# Patient Record
Sex: MALE | Race: WHITE | URBAN - METROPOLITAN AREA
[De-identification: names, ages, dates, MRNs, and addresses within clinical notes are randomized per-mention and may not be internally consistent; named-entity substitution may affect disease eponyms.]

---

## 2019-02-20 ENCOUNTER — OFFICE VISIT (OUTPATIENT)
Dept: FAMILY MEDICINE CLINIC | Age: 44
End: 2019-02-20

## 2019-02-20 VITALS
HEIGHT: 69 IN | HEART RATE: 66 BPM | WEIGHT: 156 LBS | DIASTOLIC BLOOD PRESSURE: 74 MMHG | TEMPERATURE: 98.2 F | BODY MASS INDEX: 23.11 KG/M2 | OXYGEN SATURATION: 99 % | SYSTOLIC BLOOD PRESSURE: 110 MMHG | RESPIRATION RATE: 12 BRPM

## 2019-02-20 DIAGNOSIS — F41.9 ANXIETY: Primary | ICD-10-CM

## 2019-02-20 NOTE — PROGRESS NOTES
Leonardo Abdalla is a 44 y.o. male  presents for establish care. Allergies Allergen Reactions  Penicillins Rash There is no problem list on file for this patient. Past Medical History:  
Diagnosis Date  History of seasonal allergies Social History Socioeconomic History  Marital status: UNKNOWN Spouse name: Not on file  Number of children: Not on file  Years of education: Not on file  Highest education level: Not on file Tobacco Use  Smoking status: Never Smoker  Smokeless tobacco: Never Used Substance and Sexual Activity  Alcohol use: No  
  Frequency: Never  Drug use: No  
 Sexual activity: Yes  
  Partners: Female Birth control/protection: None No family history on file. Review of Systems Constitutional: Negative for chills, fever, malaise/fatigue and weight loss. Eyes: Negative for blurred vision. Respiratory: Negative for shortness of breath and wheezing. Cardiovascular: Negative for chest pain. Gastrointestinal: Negative for nausea and vomiting. Musculoskeletal: Negative for myalgias. Skin: Negative for rash. Neurological: Negative for weakness. Psychiatric/Behavioral: Positive for depression. Negative for hallucinations, memory loss, substance abuse and suicidal ideas. The patient has insomnia. The patient is not nervous/anxious. Vitals:  
 02/20/19 1347 BP: 110/74 Pulse: 66 Resp: 12 Temp: 98.2 °F (36.8 °C) TempSrc: Oral  
SpO2: 99% Weight: 156 lb (70.8 kg) Height: 5' 9\" (1.753 m) PainSc:   0 - No pain Physical Exam  
Constitutional: He is oriented to person, place, and time. Musculoskeletal: Normal range of motion. Neurological: He is alert and oriented to person, place, and time. Gait normal.  
Skin: Skin is warm and dry. Psychiatric: Mood, memory, affect and judgment normal.  
Nursing note and vitals reviewed. Assessment/Plan ICD-10-CM ICD-9-CM 1. Anxiety F41.9 300.00 REFERRAL TO PSYCHOLOGY I have discussed the diagnosis with the patient and the intended plan of care as seen in the above orders. The patient has received an after-visit summary and questions were answered concerning future plans. I have discussed medication, side effects, and warnings with the patient in detail. The patient verbalized understanding and is in agreement with the plan of care. The patient will contact the office with any additional concerns. Follow-up Disposition: 
Return if symptoms worsen or fail to improve. 
lab results and schedule of future lab studies reviewed with patient Nicole Becerril MD

## 2019-02-20 NOTE — PROGRESS NOTES
Isa Jay 44 y.o. male being seen for Chief Complaint Patient presents with  Anxiety Anthony Establish Care The patient has been expressed that he has been experiencing anxiety for a little over a year now 1. Have you been to the ER, urgent care clinic since your last visit? Hospitalized since your last visit? No 
 
2. Have you seen or consulted any other health care providers outside of the [address] [place] [address] since your last visit? Include any pap smears or colon screening. new pt Pharmacy has been verified Care team has been verified/updated

## 2019-02-20 NOTE — PATIENT INSTRUCTIONS
